# Patient Record
Sex: FEMALE | Race: AMERICAN INDIAN OR ALASKA NATIVE | Employment: UNEMPLOYED | URBAN - NONMETROPOLITAN AREA
[De-identification: names, ages, dates, MRNs, and addresses within clinical notes are randomized per-mention and may not be internally consistent; named-entity substitution may affect disease eponyms.]

---

## 2018-10-22 ENCOUNTER — OFFICE VISIT (OUTPATIENT)
Dept: FAMILY MEDICINE | Facility: OTHER | Age: 83
End: 2018-10-22
Attending: PHYSICIAN ASSISTANT
Payer: COMMERCIAL

## 2018-10-22 VITALS
BODY MASS INDEX: 29.06 KG/M2 | HEART RATE: 88 BPM | DIASTOLIC BLOOD PRESSURE: 76 MMHG | SYSTOLIC BLOOD PRESSURE: 112 MMHG | WEIGHT: 148 LBS | OXYGEN SATURATION: 95 % | TEMPERATURE: 98.3 F | HEIGHT: 60 IN

## 2018-10-22 DIAGNOSIS — B02.9 HERPES ZOSTER WITHOUT COMPLICATION: Primary | ICD-10-CM

## 2018-10-22 DIAGNOSIS — L30.9 DERMATITIS: ICD-10-CM

## 2018-10-22 PROCEDURE — 99202 OFFICE O/P NEW SF 15 MIN: CPT | Performed by: PHYSICIAN ASSISTANT

## 2018-10-22 PROCEDURE — G0463 HOSPITAL OUTPT CLINIC VISIT: HCPCS

## 2018-10-22 RX ORDER — CHLORTHALIDONE 25 MG/1
12.5 TABLET ORAL
COMMUNITY
Start: 2018-06-19 | End: 2018-10-25 | Stop reason: DRUGHIGH

## 2018-10-22 RX ORDER — LISINOPRIL 20 MG/1
2 TABLET ORAL
Refills: 3 | COMMUNITY
Start: 2018-10-15 | End: 2018-12-03 | Stop reason: DRUGHIGH

## 2018-10-22 RX ORDER — TRIAMCINOLONE ACETONIDE 1 MG/G
CREAM TOPICAL
Qty: 80 G | Refills: 0 | Status: SHIPPED | OUTPATIENT
Start: 2018-10-22 | End: 2019-01-22

## 2018-10-22 ASSESSMENT — PATIENT HEALTH QUESTIONNAIRE - PHQ9: 5. POOR APPETITE OR OVEREATING: NOT AT ALL

## 2018-10-22 ASSESSMENT — ANXIETY QUESTIONNAIRES
1. FEELING NERVOUS, ANXIOUS, OR ON EDGE: NOT AT ALL
3. WORRYING TOO MUCH ABOUT DIFFERENT THINGS: NOT AT ALL
7. FEELING AFRAID AS IF SOMETHING AWFUL MIGHT HAPPEN: NOT AT ALL
5. BEING SO RESTLESS THAT IT IS HARD TO SIT STILL: NOT AT ALL
2. NOT BEING ABLE TO STOP OR CONTROL WORRYING: NOT AT ALL
GAD7 TOTAL SCORE: 0
6. BECOMING EASILY ANNOYED OR IRRITABLE: NOT AT ALL

## 2018-10-22 ASSESSMENT — PAIN SCALES - GENERAL: PAINLEVEL: NO PAIN (0)

## 2018-10-22 NOTE — MR AVS SNAPSHOT
After Visit Summary   10/22/2018    Siobhan Garcia    MRN: [id number]           Patient Information     Date Of Birth          1935        Visit Information        Provider Department      10/22/2018 3:00 PM Duyen Martinez PA [facility]        Today's Diagnoses     Herpes zoster without complication    -  1    Dermatitis           Follow-ups after your visit        Your next 10 appointments already scheduled     Oct 25, 2018  9:30 AM CDT   (Arrive by 9:15 AM)   Office Visit with FABY Portillo   [facility]    [address]  [facility] [place] [address]   [phone]           A parent or legal guardian is required to be present at the time of the patient's appointment.  Bring a current list of meds and any records pertaining to this visit.  For Physicals, please bring immunization records and any forms needing to be filled out.  Please arrive 15 minutes early to register.              Who to contact     If you have questions or need follow up information about today's clinic visit or your schedule please contact [facility] directly at [phone].  Normal or non-critical lab and imaging results will be communicated to you by MyChart, letter or phone within 4 business days after the clinic has received the results. If you do not hear from us within 7 days, please contact the clinic through MyChart or phone. If you have a critical or abnormal lab result, we will notify you by phone as soon as possible.  Submit refill requests through [employer] or call your pharmacy and they will forward the refill request to us. Please allow 3 business days for your refill to be completed.          Additional Information About Your Visit        Care EveryWhere ID     This is your Care EveryWhere ID. This could be used by other organizations to access your [facility] medical records  [id number]        Your  Vitals Were     Pulse Temperature Height Pulse Oximetry BMI (Body Mass Index)       88 98.3  F (36.8  C) (Tympanic) 5' (1.524 m) 95% 28.9 kg/m2        Blood Pressure from Last 3 Encounters:   10/22/18 112/76   09/30/14 152/88   09/28/12 140/80    Weight from Last 3 Encounters:   10/22/18 148 lb (67.1 kg)   09/30/14 168 lb 4 oz (76.3 kg)   09/28/12 155 lb 6 oz (70.5 kg)              Today, you had the following     No orders found for display         Today's Medication Changes          These changes are accurate as of 10/22/18  3:33 PM.  If you have any questions, ask your nurse or doctor.               Start taking these medicines.        Dose/Directions    triamcinolone 0.1 % cream   Commonly known as:  KENALOG   Used for:  Dermatitis   Started by:  Duyen Martinez PA        Apply sparingly to affected area three times daily for 14 days.   Quantity:  80 g   Refills:  0            Where to get your medicines      These medications were sent to [facility] - [place], [place] - [address]   [id number] , [place] [address]     Phone:  [phone]     triamcinolone 0.1 % cream                Primary Care Provider    None Specified       No primary provider on file.        Equal Access to Services     DANIELE BELTRE AH: Ilda messinao Soomaali, waaxda luqadaha, qaybta kaalmada adeegyada, clovis hays. So [facility] [phone].    ATENCIÓN: Si habla español, tiene a munoz disposición servicios gratuitos de asistencia lingüística. Llame al [phone].    We comply with applicable federal civil rights laws and [place] laws. We do not discriminate on the basis of race, color, national origin, age, disability, sex, sexual orientation, or gender identity.            Thank you!     Thank you for choosing [facility]  for your care. Our goal is always to provide you with excellent care. Hearing back from our patients is one way we can continue to improve our  services. Please take a few minutes to complete the written survey that you may receive in the mail after your visit with us. Thank you!             Your Updated Medication List - Protect others around you: Learn how to safely use, store and throw away your medicines at www.disposemymeds.org.          This list is accurate as of 10/22/18  3:33 PM.  Always use your most recent med list.                   Brand Name Dispense Instructions for use Diagnosis    chlorthalidone 25 MG tablet    HYGROTON     Take 12.5 mg by mouth        lisinopril 20 MG tablet    PRINIVIL/ZESTRIL     2 tablets        triamcinolone 0.1 % cream    KENALOG    80 g    Apply sparingly to affected area three times daily for 14 days.    Dermatitis

## 2018-10-22 NOTE — NURSING NOTE
Chief Complaint   Patient presents with     Derm Problem     Rash       Initial /76  Pulse 88  Temp 98.3  F (36.8  C) (Tympanic)  Ht 5' (1.524 m)  Wt 148 lb (67.1 kg)  SpO2 95%  BMI 28.9 kg/m2 Estimated body mass index is 28.9 kg/(m^2) as calculated from the following:    Height as of this encounter: 5' (1.524 m).    Weight as of this encounter: 148 lb (67.1 kg).  Medication Reconciliation: complete    Isabel Meza LPN

## 2018-10-22 NOTE — PROGRESS NOTES
SUBJECTIVE:   Siobhan Garcia is a 83 year old female who presents to clinic today for the following health issues: Itchy painful rash on back. No associated symptoms        Rash      Duration: 2 week    Description  Location: Under left armpit across the shoulders of the back  Itching: moderate    Intensity:  moderate    Accompanying signs and symptoms: None    History (similar episodes/previous evaluation): None    Precipitating or alleviating factors:  New exposures:  None  Recent travel: no      Therapies tried and outcome: Healing gold bond          Problem list and histories reviewed & adjusted, as indicated.  Additional history: as documented    There is no problem list on file for this patient.    Past Surgical History:   Procedure Laterality Date     HYSTERECTOMY VAGINAL      12, RSO, AP repair at [place]     TONSILLECTOMY      1980s       Social History   Substance Use Topics     Smoking status: Never Smoker     Smokeless tobacco: Never Used     Alcohol use No      Comment: Alcoholic Drinks/day: glass of wine occasionally     Family History   Problem Relation Age of Onset     Cancer Mother      Cancer,cervical cancer     Substance Abuse Father      Alcohol/Drug, in his 70's     Colon Cancer Brother      Cancer-colon     Cancer Brother      Cancer,Throat         Current Outpatient Prescriptions   Medication Sig Dispense Refill     chlorthalidone (HYGROTON) 25 MG tablet Take 12.5 mg by mouth       triamcinolone (KENALOG) 0.1 % cream Apply sparingly to affected area three times daily for 14 days. 80 g 0     lisinopril (PRINIVIL/ZESTRIL) 20 MG tablet 2 tablets  3     Allergies   Allergen Reactions     Sulfa Drugs Nausea     Bee Venom Rash     Wasp Venom Protein Rash       Reviewed and updated as needed this visit by clinical staff       Reviewed and updated as needed this visit by Provider         ROS:  Constitutional, HEENT, cardiovascular, pulmonary, gi and gu systems are negative, except as  otherwise noted.    OBJECTIVE:                                                    /76  Pulse 88  Temp 98.3  F (36.8  C) (Tympanic)  Ht 5' (1.524 m)  Wt 148 lb (67.1 kg)  SpO2 95%  BMI 28.9 kg/m2  Body mass index is 28.9 kg/(m^2).  GENERAL APPEARANCE: healthy, alert and no distress  RESP: lungs clear to auscultation - no rales, rhonchi or wheezes  CV: regular rates and rhythm, normal S1 S2, no S3 or S4 and no murmur, click or rub  SKIN: vesicular erythema left T3 dermatome  PSYCH: mentation appears normal and affect normal/bright         ASSESSMENT/PLAN:                                                    (B02.9) Herpes zoster without complication  (primary encounter diagnosis)  Comment: Written info to patient. Too late for anti-vral      (L30.9) Dermatitis  Comment: use for itching  Plan: triamcinolone (KENALOG) 0.1 % cream                  Follow up if symptoms persist or worsen.      FABY Srivastava  [facility]

## 2018-10-23 ASSESSMENT — PATIENT HEALTH QUESTIONNAIRE - PHQ9: SUM OF ALL RESPONSES TO PHQ QUESTIONS 1-9: 3

## 2018-10-23 ASSESSMENT — ANXIETY QUESTIONNAIRES: GAD7 TOTAL SCORE: 0

## 2018-10-24 NOTE — PROGRESS NOTES
SUBJECTIVE:   Siobhan Garcia is a 83 year old female who presents to clinic today to establish cares. History of HTN. Takes Lisinopril 40 mg daily and Chlorthalidone 12.5 mg daily. She checks BP at 120/70. She would like to stop the Chlorthalidone.. [facility] hysterectomy and bilateral oophorectomy in .Tonsillectomy. Retired teacher. . 4 sons. Non-smoker. Previous cares at [facility].      New Patient/Transfer of Care    shingles      Duration: 1.5 weeks    Description  Location: upper left back  Itching: moderate    Intensity:  moderate    Accompanying signs and symptoms: just itching so far    History (similar episodes/previous evaluation): None    Precipitating or alleviating factors:  New exposures:  None   Recent travel: no      Therapies tried and outcome: Kenalog cream, does not help with the itching      Problem list and histories reviewed & adjusted, as indicated.  Additional history:     There is no problem list on file for this patient.    Past Surgical History:   Procedure Laterality Date     HYSTERECTOMY VAGINAL      12, RSO, AP repair at [facility]     TONSILLECTOMY      1980s       Social History   Substance Use Topics     Smoking status: Never Smoker     Smokeless tobacco: Never Used     Alcohol use No      Comment: Alcoholic Drinks/day: glass of wine occasionally     Family History   Problem Relation Age of Onset     Cancer Mother      Cancer,cervical cancer     Substance Abuse Father      Alcohol/Drug, in his 70's     Colon Cancer Brother      Cancer-colon     Cancer Brother      Cancer,Throat         Current Outpatient Prescriptions   Medication Sig Dispense Refill     lisinopril (PRINIVIL/ZESTRIL) 20 MG tablet 2 tablets  3     triamcinolone (KENALOG) 0.1 % cream Apply sparingly to affected area three times daily for 14 days. 80 g 0     Allergies   Allergen Reactions     Sulfa Drugs Nausea     Bee Venom Rash     Wasp Venom Protein Rash       Reviewed and updated as needed this visit by  clinical staff       Reviewed and updated as needed this visit by Provider         ROS:  Constitutional, HEENT, cardiovascular, pulmonary, gi and gu systems are negative, except as otherwise noted.    OBJECTIVE:                                                    /73  Pulse 85  Temp 97.1  F (36.2  C) (Tympanic)  Resp 18  Ht 5' (1.524 m)  Wt 153 lb (69.4 kg)  SpO2 95%  BMI 29.88 kg/m2  Body mass index is 29.88 kg/(m^2).  GENERAL APPEARANCE: healthy, alert and no distress  RESP: lungs clear to auscultation - no rales, rhonchi or wheezes  CV: regular rates and rhythm, normal S1 S2, no S3 or S4 and no murmur, click or rub  SKIN: no suspicious lesions or rashes  PSYCH: mentation appears normal and affect normal/bright         ASSESSMENT/PLAN:                                                    (Z76.89) Encounter to establish care  (primary encounter diagnosis)  Comment: she will return for fasting labs. Stop Chlorthalidone. Monitor BP's. Return with readings    (I10) Benign essential hypertension  Comment: stable. As above        1 month f/u    FABY Srivastava  [facility]

## 2018-10-25 ENCOUNTER — OFFICE VISIT (OUTPATIENT)
Dept: FAMILY MEDICINE | Facility: OTHER | Age: 83
End: 2018-10-25
Attending: PHYSICIAN ASSISTANT
Payer: COMMERCIAL

## 2018-10-25 VITALS
HEIGHT: 60 IN | RESPIRATION RATE: 18 BRPM | HEART RATE: 85 BPM | WEIGHT: 153 LBS | TEMPERATURE: 97.1 F | DIASTOLIC BLOOD PRESSURE: 73 MMHG | OXYGEN SATURATION: 95 % | BODY MASS INDEX: 30.04 KG/M2 | SYSTOLIC BLOOD PRESSURE: 120 MMHG

## 2018-10-25 DIAGNOSIS — I10 BENIGN ESSENTIAL HYPERTENSION: ICD-10-CM

## 2018-10-25 DIAGNOSIS — Z76.89 ENCOUNTER TO ESTABLISH CARE: Primary | ICD-10-CM

## 2018-10-25 PROCEDURE — G0463 HOSPITAL OUTPT CLINIC VISIT: HCPCS

## 2018-10-25 PROCEDURE — 99213 OFFICE O/P EST LOW 20 MIN: CPT | Performed by: PHYSICIAN ASSISTANT

## 2018-10-25 PROCEDURE — 94760 N-INVAS EAR/PLS OXIMETRY 1: CPT

## 2018-10-25 ASSESSMENT — PATIENT HEALTH QUESTIONNAIRE - PHQ9
5. POOR APPETITE OR OVEREATING: NOT AT ALL
SUM OF ALL RESPONSES TO PHQ QUESTIONS 1-9: 2

## 2018-10-25 ASSESSMENT — ANXIETY QUESTIONNAIRES
7. FEELING AFRAID AS IF SOMETHING AWFUL MIGHT HAPPEN: NOT AT ALL
IF YOU CHECKED OFF ANY PROBLEMS ON THIS QUESTIONNAIRE, HOW DIFFICULT HAVE THESE PROBLEMS MADE IT FOR YOU TO DO YOUR WORK, TAKE CARE OF THINGS AT HOME, OR GET ALONG WITH OTHER PEOPLE: NOT DIFFICULT AT ALL
GAD7 TOTAL SCORE: 0
2. NOT BEING ABLE TO STOP OR CONTROL WORRYING: NOT AT ALL
5. BEING SO RESTLESS THAT IT IS HARD TO SIT STILL: NOT AT ALL
1. FEELING NERVOUS, ANXIOUS, OR ON EDGE: NOT AT ALL
3. WORRYING TOO MUCH ABOUT DIFFERENT THINGS: NOT AT ALL
6. BECOMING EASILY ANNOYED OR IRRITABLE: NOT AT ALL

## 2018-10-25 ASSESSMENT — PAIN SCALES - GENERAL: PAINLEVEL: NO PAIN (0)

## 2018-10-25 NOTE — MR AVS SNAPSHOT
After Visit Summary   10/25/2018    Siobhan Garcia    MRN: [id number]           Patient Information     Date Of Birth          1935        Visit Information        Provider Department      10/25/2018 9:30 AM Duyen Martinez PA [facility]        Today's Diagnoses     Encounter to establish care    -  1    Benign essential hypertension           Follow-ups after your visit        Future tests that were ordered for you today     Open Future Orders        Priority Expected Expires Ordered    Basic metabolic panel Routine  1/25/2019 10/25/2018    Lipid Profile Routine  1/25/2019 10/25/2018    CBC with platelets Routine  1/25/2019 10/25/2018            Who to contact     If you have questions or need follow up information about today's clinic visit or your schedule please contact [facility] directly at [phone].  Normal or non-critical lab and imaging results will be communicated to you by MyChart, letter or phone within 4 business days after the clinic has received the results. If you do not hear from us within 7 days, please contact the clinic through MyChart or phone. If you have a critical or abnormal lab result, we will notify you by phone as soon as possible.  Submit refill requests through [employer] or call your pharmacy and they will forward the refill request to us. Please allow 3 business days for your refill to be completed.          Additional Information About Your Visit        Care EveryWhere ID     This is your Care EveryWhere ID. This could be used by other organizations to access your [facility] medical records  [id number]        Your Vitals Were     Pulse Temperature Respirations Height Pulse Oximetry BMI (Body Mass Index)    85 97.1  F (36.2  C) (Tympanic) 18 5' (1.524 m) 95% 29.88 kg/m2       Blood Pressure from Last 3 Encounters:   10/25/18 120/73   10/22/18 112/76   09/30/14 152/88    Weight from Last 3 Encounters:   10/25/18 153  lb (69.4 kg)   10/22/18 148 lb (67.1 kg)   09/30/14 168 lb 4 oz (76.3 kg)                 Today's Medication Changes          These changes are accurate as of 10/25/18 10:00 AM.  If you have any questions, ask your nurse or doctor.               Stop taking these medicines if you haven't already. Please contact your care team if you have questions.     chlorthalidone 25 MG tablet   Commonly known as:  HYGROTON   Stopped by:  Duyen Mratinez PA                    Primary Care Provider Office Phone # Fax #    FABY Portillo [phone]       [facility] [place] [address]  [place] [address]        Equal Access to Services     DANIELE BELTRE : Hadii darnell ku hadasho Soomaali, waaxda luqadaha, qaybta kaalmada adeegyada, waxgatito delgado haydick moreno . So [facility] [phone].    ATENCIÓN: Si habla español, tiene a munoz disposición servicios gratuitos de asistencia lingüística. Llame al [phone].    We comply with applicable federal civil rights laws and [place] laws. We do not discriminate on the basis of race, color, national origin, age, disability, sex, sexual orientation, or gender identity.            Thank you!     Thank you for choosing [facility]  for your care. Our goal is always to provide you with excellent care. Hearing back from our patients is one way we can continue to improve our services. Please take a few minutes to complete the written survey that you may receive in the mail after your visit with us. Thank you!             Your Updated Medication List - Protect others around you: Learn how to safely use, store and throw away your medicines at www.disposemymeds.org.          This list is accurate as of 10/25/18 10:00 AM.  Always use your most recent med list.                   Brand Name Dispense Instructions for use Diagnosis    lisinopril 20 MG tablet    PRINIVIL/ZESTRIL     2 tablets        triamcinolone 0.1 % cream    KENALOG    80  g    Apply sparingly to affected area three times daily for 14 days.    Dermatitis

## 2018-10-25 NOTE — NURSING NOTE
Chief Complaint   Patient presents with     Establish Care     Shingles       Initial /73  Pulse 85  Temp 97.1  F (36.2  C) (Tympanic)  Resp 18  Ht 5' (1.524 m)  Wt 153 lb (69.4 kg)  SpO2 95%  BMI 29.88 kg/m2 Estimated body mass index is 29.88 kg/(m^2) as calculated from the following:    Height as of this encounter: 5' (1.524 m).    Weight as of this encounter: 153 lb (69.4 kg).  Medication Reconciliation: complete    Kristan Hansen LPN

## 2018-10-26 ASSESSMENT — ANXIETY QUESTIONNAIRES: GAD7 TOTAL SCORE: 0

## 2018-11-29 DIAGNOSIS — I10 BENIGN ESSENTIAL HYPERTENSION: ICD-10-CM

## 2018-11-29 LAB
ANION GAP SERPL CALCULATED.3IONS-SCNC: 5 MMOL/L (ref 3–14)
BUN SERPL-MCNC: 26 MG/DL (ref 7–30)
CALCIUM SERPL-MCNC: 9.7 MG/DL (ref 8.5–10.1)
CHLORIDE SERPL-SCNC: 105 MMOL/L (ref 94–109)
CHOLEST SERPL-MCNC: 192 MG/DL
CO2 SERPL-SCNC: 26 MMOL/L (ref 20–32)
CREAT SERPL-MCNC: 0.96 MG/DL (ref 0.52–1.04)
ERYTHROCYTE [DISTWIDTH] IN BLOOD BY AUTOMATED COUNT: 12.2 % (ref 10–15)
GFR SERPL CREATININE-BSD FRML MDRD: 56 ML/MIN/1.7M2
GLUCOSE SERPL-MCNC: 98 MG/DL (ref 70–99)
HCT VFR BLD AUTO: 33.9 % (ref 35–47)
HDLC SERPL-MCNC: 59 MG/DL
HGB BLD-MCNC: 10.8 G/DL (ref 11.7–15.7)
LDLC SERPL CALC-MCNC: 109 MG/DL
MCH RBC QN AUTO: 30.2 PG (ref 26.5–33)
MCHC RBC AUTO-ENTMCNC: 31.9 G/DL (ref 31.5–36.5)
MCV RBC AUTO: 95 FL (ref 78–100)
NONHDLC SERPL-MCNC: 133 MG/DL
PLATELET # BLD AUTO: 542 10E9/L (ref 150–450)
POTASSIUM SERPL-SCNC: 4.2 MMOL/L (ref 3.4–5.3)
RBC # BLD AUTO: 3.58 10E12/L (ref 3.8–5.2)
SODIUM SERPL-SCNC: 136 MMOL/L (ref 133–144)
TRIGL SERPL-MCNC: 120 MG/DL
WBC # BLD AUTO: 8.2 10E9/L (ref 4–11)

## 2018-11-29 PROCEDURE — 80061 LIPID PANEL: CPT | Mod: ZL | Performed by: PHYSICIAN ASSISTANT

## 2018-11-29 PROCEDURE — 85027 COMPLETE CBC AUTOMATED: CPT | Mod: ZL | Performed by: PHYSICIAN ASSISTANT

## 2018-11-29 PROCEDURE — 36415 COLL VENOUS BLD VENIPUNCTURE: CPT | Mod: ZL | Performed by: PHYSICIAN ASSISTANT

## 2018-11-29 PROCEDURE — 80048 BASIC METABOLIC PNL TOTAL CA: CPT | Mod: ZL | Performed by: PHYSICIAN ASSISTANT

## 2018-11-30 NOTE — PROGRESS NOTES
SUBJECTIVE:   Siobhan Garcia is a 83 year old female who presents to clinic today for the following health issues:      Musculoskeletal problem/pain      Duration: past couple weeks    Description  Location: muscle aches all over    Intensity:  severe    Accompanying signs and symptoms: radiation of pain to All over at different times    History  Previous similar problem: no   Previous evaluation:  none    Precipitating or alleviating factors:  Trauma or overuse: no   Aggravating factors include: unsure constant ache    Therapies tried and outcome: rest/inactivity, Ibuprofen and NSAID - Aleve, helps for couple hours but has to keep repeating doses.          Problem list and histories reviewed & adjusted, as indicated.  Additional history: as documented    Patient Active Problem List   Diagnosis     Benign essential hypertension     Past Surgical History:   Procedure Laterality Date     HYSTERECTOMY VAGINAL      12, RSO, AP repair at [place]     TONSILLECTOMY      [facility]       Social History   Substance Use Topics     Smoking status: Never Smoker     Smokeless tobacco: Never Used     Alcohol use No      Comment: Alcoholic Drinks/day: glass of wine occasionally     Family History   Problem Relation Age of Onset     Cancer Mother      Cancer,cervical cancer     Substance Abuse Father      Alcohol/Drug, in his 70's     Colon Cancer Brother      Cancer-colon     Cancer Brother      Cancer,Throat         Current Outpatient Prescriptions   Medication Sig Dispense Refill     lisinopril (PRINIVIL/ZESTRIL) 20 MG tablet 2 tablets  3     triamcinolone (KENALOG) 0.1 % cream Apply sparingly to affected area three times daily for 14 days. 80 g 0     Allergies   Allergen Reactions     Sulfa Drugs Nausea     Bee Venom Rash     Wasp Venom Protein Rash       Reviewed and updated as needed this visit by clinical staff       Reviewed and updated as needed this visit by Provider         ROS:  Constitutional, HEENT,  cardiovascular, pulmonary, gi and gu systems are negative, except as otherwise noted.    OBJECTIVE:                                                    /58 (BP Location: Right arm, Patient Position: Sitting, Cuff Size: Adult Regular)  Pulse 87  Temp 97.5  F (36.4  C) (Tympanic)  Wt 154 lb (69.9 kg)  SpO2 93%  BMI 30.08 kg/m2  Body mass index is 30.08 kg/(m^2).          Physical Exam:  Constitutional: healthy, alert and no acute distress  Skin: No suspicious rash or skin lesion  ENT: Posterior pharynx moist and pink without edema or exudate.  EAC's clear. TM's intact bilateral.  CV: RRR. No murmur  Pulm: Lungs clear to auscultation without wheeze, rales or rhonchi  GI: Abdomen soft, non-tender. BS normal. No masses, organomegaly  Psych: mentation and affect appear normal                   ASSESSMENT/PLAN:                                                      (R82.79) Abnormal findings on microbiological examination of urine  (primary encounter diagnosis)  Plan: Urine Culture Aerobic Bacterial, ciprofloxacin         (CIPRO) 500 MG tablet            (M79.10) Myalgia  Comment: Sed rate 87. Bacteriuria. Unsure if correlation. Will treat with short f/u to recheck labs  Plan: CBC with platelets and differential, *UA reflex        to Microscopic and Culture - [facility],         Erythrocyte sedimentation rate auto, Urine         Microscopic, predniSONE (DELTASONE) 20 MG         tablet            (I10) Benign essential hypertension  Comment: RF  Plan: lisinopril (PRINIVIL/ZESTRIL) 40 MG tablet            (D64.9) Anemia, unspecified type  Comment: History of this. Restart Ferrous Sulfate 325 mg daily with 1 month recheck        Rest, increase fluids, Tylenol for fever or discomfort. Return to clinic if symptoms persist or worsen.      FABY Srivastava  [facility]

## 2018-12-03 ENCOUNTER — OFFICE VISIT (OUTPATIENT)
Dept: FAMILY MEDICINE | Facility: OTHER | Age: 83
End: 2018-12-03
Attending: PHYSICIAN ASSISTANT
Payer: COMMERCIAL

## 2018-12-03 VITALS
BODY MASS INDEX: 30.08 KG/M2 | OXYGEN SATURATION: 93 % | WEIGHT: 154 LBS | SYSTOLIC BLOOD PRESSURE: 138 MMHG | DIASTOLIC BLOOD PRESSURE: 58 MMHG | HEART RATE: 87 BPM | TEMPERATURE: 97.5 F

## 2018-12-03 DIAGNOSIS — I10 BENIGN ESSENTIAL HYPERTENSION: ICD-10-CM

## 2018-12-03 DIAGNOSIS — D64.9 ANEMIA, UNSPECIFIED TYPE: ICD-10-CM

## 2018-12-03 DIAGNOSIS — M79.10 MYALGIA: ICD-10-CM

## 2018-12-03 DIAGNOSIS — R82.79 ABNORMAL FINDINGS ON MICROBIOLOGICAL EXAMINATION OF URINE: Primary | ICD-10-CM

## 2018-12-03 LAB
ALBUMIN UR-MCNC: NEGATIVE MG/DL
APPEARANCE UR: CLEAR
BACTERIA #/AREA URNS HPF: ABNORMAL /HPF
BASOPHILS # BLD AUTO: 0.1 10E9/L (ref 0–0.2)
BASOPHILS NFR BLD AUTO: 0.9 %
BILIRUB UR QL STRIP: NEGATIVE
COLOR UR AUTO: YELLOW
DIFFERENTIAL METHOD BLD: ABNORMAL
EOSINOPHIL # BLD AUTO: 0.1 10E9/L (ref 0–0.7)
EOSINOPHIL NFR BLD AUTO: 0.9 %
ERYTHROCYTE [DISTWIDTH] IN BLOOD BY AUTOMATED COUNT: 12.2 % (ref 10–15)
ERYTHROCYTE [SEDIMENTATION RATE] IN BLOOD BY WESTERGREN METHOD: 87 MM/H (ref 0–30)
GLUCOSE UR STRIP-MCNC: NEGATIVE MG/DL
HCT VFR BLD AUTO: 33 % (ref 35–47)
HGB BLD-MCNC: 10.4 G/DL (ref 11.7–15.7)
HGB UR QL STRIP: NEGATIVE
KETONES UR STRIP-MCNC: NEGATIVE MG/DL
LEUKOCYTE ESTERASE UR QL STRIP: ABNORMAL
LYMPHOCYTES # BLD AUTO: 1.5 10E9/L (ref 0.8–5.3)
LYMPHOCYTES NFR BLD AUTO: 14.9 %
MCH RBC QN AUTO: 30.1 PG (ref 26.5–33)
MCHC RBC AUTO-ENTMCNC: 31.5 G/DL (ref 31.5–36.5)
MCV RBC AUTO: 95 FL (ref 78–100)
MONOCYTES # BLD AUTO: 0.8 10E9/L (ref 0–1.3)
MONOCYTES NFR BLD AUTO: 8.2 %
NEUTROPHILS # BLD AUTO: 7.5 10E9/L (ref 1.6–8.3)
NEUTROPHILS NFR BLD AUTO: 75.1 %
NITRATE UR QL: NEGATIVE
NON-SQ EPI CELLS #/AREA URNS LPF: ABNORMAL /LPF
PH UR STRIP: 6 PH (ref 5–7)
PLATELET # BLD AUTO: 392 10E9/L (ref 150–450)
RBC # BLD AUTO: 3.46 10E12/L (ref 3.8–5.2)
RBC #/AREA URNS AUTO: ABNORMAL /HPF
SOURCE: ABNORMAL
SP GR UR STRIP: 1.02 (ref 1–1.03)
UROBILINOGEN UR STRIP-ACNC: 0.2 EU/DL (ref 0.2–1)
WBC # BLD AUTO: 10 10E9/L (ref 4–11)
WBC #/AREA URNS AUTO: ABNORMAL /HPF

## 2018-12-03 PROCEDURE — 85025 COMPLETE CBC W/AUTO DIFF WBC: CPT | Mod: ZL | Performed by: PHYSICIAN ASSISTANT

## 2018-12-03 PROCEDURE — 81001 URINALYSIS AUTO W/SCOPE: CPT | Mod: ZL | Performed by: PHYSICIAN ASSISTANT

## 2018-12-03 PROCEDURE — G0463 HOSPITAL OUTPT CLINIC VISIT: HCPCS

## 2018-12-03 PROCEDURE — 87086 URINE CULTURE/COLONY COUNT: CPT | Mod: ZL | Performed by: PHYSICIAN ASSISTANT

## 2018-12-03 PROCEDURE — 99214 OFFICE O/P EST MOD 30 MIN: CPT | Performed by: PHYSICIAN ASSISTANT

## 2018-12-03 PROCEDURE — 85652 RBC SED RATE AUTOMATED: CPT | Mod: ZL | Performed by: PHYSICIAN ASSISTANT

## 2018-12-03 PROCEDURE — 36415 COLL VENOUS BLD VENIPUNCTURE: CPT | Mod: ZL | Performed by: PHYSICIAN ASSISTANT

## 2018-12-03 RX ORDER — CIPROFLOXACIN 500 MG/1
500 TABLET, FILM COATED ORAL 2 TIMES DAILY
Qty: 14 TABLET | Refills: 0 | Status: SHIPPED | OUTPATIENT
Start: 2018-12-03 | End: 2018-12-20

## 2018-12-03 RX ORDER — LISINOPRIL 40 MG/1
40 TABLET ORAL DAILY
Qty: 90 TABLET | Refills: 1 | Status: SHIPPED | OUTPATIENT
Start: 2018-12-03

## 2018-12-03 RX ORDER — PREDNISONE 20 MG/1
40 TABLET ORAL DAILY
Qty: 10 TABLET | Refills: 0 | Status: SHIPPED | OUTPATIENT
Start: 2018-12-03 | End: 2018-12-08

## 2018-12-03 ASSESSMENT — ANXIETY QUESTIONNAIRES
IF YOU CHECKED OFF ANY PROBLEMS ON THIS QUESTIONNAIRE, HOW DIFFICULT HAVE THESE PROBLEMS MADE IT FOR YOU TO DO YOUR WORK, TAKE CARE OF THINGS AT HOME, OR GET ALONG WITH OTHER PEOPLE: NOT DIFFICULT AT ALL
5. BEING SO RESTLESS THAT IT IS HARD TO SIT STILL: NOT AT ALL
6. BECOMING EASILY ANNOYED OR IRRITABLE: NOT AT ALL
1. FEELING NERVOUS, ANXIOUS, OR ON EDGE: NOT AT ALL
GAD7 TOTAL SCORE: 0
4. TROUBLE RELAXING: NOT AT ALL
7. FEELING AFRAID AS IF SOMETHING AWFUL MIGHT HAPPEN: NOT AT ALL
2. NOT BEING ABLE TO STOP OR CONTROL WORRYING: NOT AT ALL
3. WORRYING TOO MUCH ABOUT DIFFERENT THINGS: NOT AT ALL

## 2018-12-03 ASSESSMENT — PATIENT HEALTH QUESTIONNAIRE - PHQ9: SUM OF ALL RESPONSES TO PHQ QUESTIONS 1-9: 5

## 2018-12-03 ASSESSMENT — PAIN SCALES - GENERAL: PAINLEVEL: EXTREME PAIN (9)

## 2018-12-03 NOTE — MR AVS SNAPSHOT
After Visit Summary   12/3/2018    Siobhan Garcia    MRN: [id number]           Patient Information     Date Of Birth          1935        Visit Information        Provider Department      12/3/2018 11:00 AM Duyen Martinez PA [facility]        Today's Diagnoses     Abnormal findings on microbiological examination of urine    -  1    Myalgia        Benign essential hypertension        Anemia, unspecified type          Care Instructions    Follow up if symptoms persist or worsen.            Follow-ups after your visit        Your next 10 appointments already scheduled     Dec 18, 2018 11:00 AM CST   (Arrive by 10:45 AM)   SHORT with FABY Portillo   [facility]    [address]  [facility] [address]   [phone]              Who to contact     If you have questions or need follow up information about today's clinic visit or your schedule please contact [facility] directly at [phone].  Normal or non-critical lab and imaging results will be communicated to you by MyChart, letter or phone within 4 business days after the clinic has received the results. If you do not hear from us within 7 days, please contact the clinic through MyChart or phone. If you have a critical or abnormal lab result, we will notify you by phone as soon as possible.  Submit refill requests through [employer] or call your pharmacy and they will forward the refill request to us. Please allow 3 business days for your refill to be completed.          Additional Information About Your Visit        Care EveryWhere ID     This is your Care EveryWhere ID. This could be used by other organizations to access your [facility] medical records  [id number]        Your Vitals Were     Pulse Temperature Pulse Oximetry BMI (Body Mass Index)          87 97.5  F (36.4  C) (Tympanic) 93% 30.08 kg/m2         Blood Pressure from  Last 3 Encounters:   12/03/18 138/58   10/25/18 120/73   10/22/18 112/76    Weight from Last 3 Encounters:   12/03/18 154 lb (69.9 kg)   10/25/18 153 lb (69.4 kg)   10/22/18 148 lb (67.1 kg)              We Performed the Following     *UA reflex to Microscopic and Culture - [facility]     CBC with platelets and differential     Erythrocyte sedimentation rate auto     Urine Culture Aerobic Bacterial     Urine Microscopic          Today's Medication Changes          These changes are accurate as of 12/3/18 11:49 AM.  If you have any questions, ask your nurse or doctor.               Start taking these medicines.        Dose/Directions    ciprofloxacin 500 MG tablet   Commonly known as:  CIPRO   Used for:  Abnormal findings on microbiological examination of urine   Started by:  Duyen Martinez PA        Dose:  500 mg   Take 1 tablet (500 mg) by mouth 2 times daily   Quantity:  14 tablet   Refills:  0       predniSONE 20 MG tablet   Commonly known as:  DELTASONE   Used for:  Myalgia   Started by:  Duyen Martinez PA        Dose:  40 mg   Take 2 tablets (40 mg) by mouth daily for 5 days   Quantity:  10 tablet   Refills:  0         These medicines have changed or have updated prescriptions.        Dose/Directions    lisinopril 40 MG tablet   Commonly known as:  PRINIVIL/ZESTRIL   This may have changed:    - medication strength  - how to take this  - when to take this   Used for:  Benign essential hypertension   Changed by:  Duyen Martinez PA        Dose:  40 mg   Take 1 tablet (40 mg) by mouth daily   Quantity:  90 tablet   Refills:  1            Where to get your medicines      These [place] [facility] [address]  [address] [address]     Phone:  [phone]     lisinopril 40 MG tablet         These medications were sent to [facility] - [place] - [address] [address]     Phone:   [phone]     ciprofloxacin 500 MG tablet    predniSONE 20 MG tablet                Primary Care Provider Office Phone # Fax #    Duyen FELTON FABY Martinez [phone]       [facility] [place] [address]  [place] [address]        Equal Access to Services     DANIELE BELTRE : Hadii aad ku hadasho Soomaali, waaxda luqadaha, qaybta kaalmada adeegyada, waxay idiin hayaan adeeg michaelnoreen lacollette hays. So [facility] [phone].    ATENCIÓN: Si habla español, tiene a munoz disposición servicios gratuitos de asistencia lingüística. Llame al [phone].    We comply with applicable federal civil rights laws and [place] laws. We do not discriminate on the basis of race, color, national origin, age, disability, sex, sexual orientation, or gender identity.            Thank you!     Thank you for choosing [facility]  for your care. Our goal is always to provide you with excellent care. Hearing back from our patients is one way we can continue to improve our services. Please take a few minutes to complete the written survey that you may receive in the mail after your visit with us. Thank you!             Your Updated Medication List - Protect others around you: Learn how to safely use, store and throw away your medicines at www.disposemymeds.org.          This list is accurate as of 12/3/18 11:49 AM.  Always use your most recent med list.                   Brand Name Dispense Instructions for use Diagnosis    ciprofloxacin 500 MG tablet    CIPRO    14 tablet    Take 1 tablet (500 mg) by mouth 2 times daily    Abnormal findings on microbiological examination of urine       lisinopril 40 MG tablet    PRINIVIL/ZESTRIL    90 tablet    Take 1 tablet (40 mg) by mouth daily    Benign essential hypertension       predniSONE 20 MG tablet    DELTASONE    10 tablet    Take 2 tablets (40 mg) by mouth daily for 5 days    Myalgia       triamcinolone 0.1 % external cream    KENALOG    80 g    Apply  sparingly to affected area three times daily for 14 days.    Dermatitis

## 2018-12-03 NOTE — NURSING NOTE
Chief Complaint   Patient presents with     Musculoskeletal Problem       Initial /58 (BP Location: Right arm, Patient Position: Sitting, Cuff Size: Adult Regular)  Pulse 87  Temp 97.5  F (36.4  C) (Tympanic)  Wt 154 lb (69.9 kg)  SpO2 93%  BMI 30.08 kg/m2 Estimated body mass index is 30.08 kg/(m^2) as calculated from the following:    Height as of 10/25/18: 5' (1.524 m).    Weight as of this encounter: 154 lb (69.9 kg).  Medication Reconciliation: complete    Sultana Briones LPN

## 2018-12-04 LAB
BACTERIA SPEC CULT: NORMAL
SPECIMEN SOURCE: NORMAL

## 2018-12-04 ASSESSMENT — ANXIETY QUESTIONNAIRES: GAD7 TOTAL SCORE: 0

## 2018-12-06 NOTE — PROGRESS NOTES
SUBJECTIVE:   Siobhan Garcia is a 83 year old female who presents to clinic today for the following health issues:    Muscle aches      Duration: 3 weeks    Description (location/character/radiation): all over body    Intensity:  moderate    Accompanying signs and symptoms: muscle aches    History (similar episodes/previous evaluation): kory Geller 12/3/18    Precipitating or alleviating factors: None    Therapies tried and outcome: ibuprofen, prednisone       PROBLEMS TO ADD ON...    Problem list and histories reviewed & adjusted, as indicated.  Additional history: pain came back right away after stopping the prednisone.  Lengthy review today.  She felt she had a UTI and that's why she hurt.  I reviewed at length.  We wrote things down.  I think she has PMR.      Patient Active Problem List   Diagnosis     Benign essential hypertension     Uterine prolapse     Hyperlipidemia     Past Surgical History:   Procedure Laterality Date     HYSTERECTOMY VAGINAL      12, RSO, AP repair at [place]     [facility]OMY      [facility]       Social History     Tobacco Use     Smoking status: Never Smoker     Smokeless tobacco: Never Used   Substance Use Topics     Alcohol use: No     Comment: Alcoholic Drinks/day: glass of wine occasionally     Family History   Problem Relation Age of Onset     Cancer Mother         Cancer,cervical cancer     Substance Abuse Father         Alcohol/Drug, in his 70's     Colon Cancer Brother         Cancer-colon     Cancer Brother         Cancer,Throat         Current Outpatient Medications   Medication Sig Dispense Refill     lisinopril (PRINIVIL/ZESTRIL) 40 MG tablet Take 1 tablet (40 mg) by mouth daily 90 tablet 1     predniSONE (DELTASONE) 20 MG tablet Take 20 mg by mouth daily. 30 tablet 1     triamcinolone (KENALOG) 0.1 % cream Apply sparingly to affected area three times daily for 14 days. 80 g 0     Allergies   Allergen Reactions     Sulfa Drugs Nausea     Bee Venom Rash     Wasp Venom  Protein Rash       Reviewed and updated as needed this visit by clinical staff       Reviewed and updated as needed this visit by Provider         ROS:  Constitutional, HEENT, cardiovascular, pulmonary, gi and gu systems are negative, except as otherwise noted.    OBJECTIVE:                                                    /78   Pulse 85   Temp 98.4  F (36.9  C)   Wt 68 kg (150 lb)   SpO2 95%   BMI 29.29 kg/m    Body mass index is 29.29 kg/m .  GENERAL APPEARANCE: Alert, no acute distress  CV: regular rate and rhythm, no murmur, rub or gallop  RESP: lungs clear to auscultation bilaterally  ABDOMEN: normal bowel sounds, soft, nontender, no hepatosplenomegaly or other masses  SKIN: no suspicious lesions or rashes to visualized skin  NEURO: Alert, oriented x 3, speech and mentation normal  MSK;  Non tender muscles.      Esr recheck today.      ASSESSMENT/PLAN:                                                    1. PMR (polymyalgia rheumatica) (H)  Reviewed.   1 month f/u.  Rheum referral.  Prednisone 20 daily for now.  Anticipate 1 month of that for sure.    - ESR: Erythrocyte sedimentation rate  - RHEUMATOLOGY REFERRAL  - predniSONE (DELTASONE) 20 MG tablet; Take 20 mg by mouth daily.  Dispense: 30 tablet; Refill: 1      25 minutes spent with patient over 50% in counseling about PMR, the fact she didn't have a UTI, and the tx and strategy for this.  I told her I'm not 100% sure it is PMR but it seems likely (high ESR with profound response to prednisone).      Pb Gallardo MD  [facility]

## 2018-12-13 ENCOUNTER — TELEPHONE (OUTPATIENT)
Dept: FAMILY MEDICINE | Facility: OTHER | Age: 83
End: 2018-12-13

## 2018-12-13 NOTE — TELEPHONE ENCOUNTER
I spoke with Siobhan.  She is looking for medication to help with her muscle pains.  Adv that I would forward the message to Dr Rios and call her back with the providers decision.    Leila Matthew

## 2018-12-13 NOTE — TELEPHONE ENCOUNTER
11:39 AM    Reason for Call: Phone Call    Description: pt wants to talk to Yoseph nurse about pains in her muscles    Was an appointment offered for this call? No    If yes : Appointment type              Date    Preferred method for responding to this message: telephone  What is your phone number ? [phone]    If we cannot reach you directly, may we leave a detailed response at the number you provided?  no    Can this message wait until your PCP/provider returns, if available today? Cecily Shore

## 2018-12-13 NOTE — TELEPHONE ENCOUNTER
Left message stating that she will need to schedule a follow up appointment for further discussion.    Leila Matthew MA

## 2018-12-20 ENCOUNTER — OFFICE VISIT (OUTPATIENT)
Dept: FAMILY MEDICINE | Facility: OTHER | Age: 83
End: 2018-12-20
Attending: PHYSICIAN ASSISTANT
Payer: COMMERCIAL

## 2018-12-20 VITALS
SYSTOLIC BLOOD PRESSURE: 168 MMHG | WEIGHT: 150 LBS | BODY MASS INDEX: 29.29 KG/M2 | HEART RATE: 85 BPM | DIASTOLIC BLOOD PRESSURE: 78 MMHG | OXYGEN SATURATION: 95 % | TEMPERATURE: 98.4 F

## 2018-12-20 DIAGNOSIS — M35.3 PMR (POLYMYALGIA RHEUMATICA) (H): Primary | ICD-10-CM

## 2018-12-20 LAB — ERYTHROCYTE [SEDIMENTATION RATE] IN BLOOD BY WESTERGREN METHOD: 82 MM/H (ref 0–30)

## 2018-12-20 PROCEDURE — G0463 HOSPITAL OUTPT CLINIC VISIT: HCPCS

## 2018-12-20 PROCEDURE — 85652 RBC SED RATE AUTOMATED: CPT | Mod: ZL | Performed by: FAMILY MEDICINE

## 2018-12-20 PROCEDURE — 99214 OFFICE O/P EST MOD 30 MIN: CPT | Performed by: FAMILY MEDICINE

## 2018-12-20 PROCEDURE — 36415 COLL VENOUS BLD VENIPUNCTURE: CPT | Mod: ZL | Performed by: FAMILY MEDICINE

## 2018-12-20 RX ORDER — PREDNISONE 20 MG/1
20 TABLET ORAL DAILY
Qty: 30 TABLET | Refills: 1 | Status: SHIPPED | OUTPATIENT
Start: 2018-12-20 | End: 2019-01-22

## 2018-12-20 ASSESSMENT — ANXIETY QUESTIONNAIRES
6. BECOMING EASILY ANNOYED OR IRRITABLE: NOT AT ALL
7. FEELING AFRAID AS IF SOMETHING AWFUL MIGHT HAPPEN: NOT AT ALL
5. BEING SO RESTLESS THAT IT IS HARD TO SIT STILL: NOT AT ALL
3. WORRYING TOO MUCH ABOUT DIFFERENT THINGS: NOT AT ALL
1. FEELING NERVOUS, ANXIOUS, OR ON EDGE: NOT AT ALL
GAD7 TOTAL SCORE: 0
2. NOT BEING ABLE TO STOP OR CONTROL WORRYING: NOT AT ALL

## 2018-12-20 ASSESSMENT — PATIENT HEALTH QUESTIONNAIRE - PHQ9
5. POOR APPETITE OR OVEREATING: NOT AT ALL
SUM OF ALL RESPONSES TO PHQ QUESTIONS 1-9: 0

## 2018-12-20 ASSESSMENT — PAIN SCALES - GENERAL: PAINLEVEL: MODERATE PAIN (5)

## 2018-12-20 NOTE — NURSING NOTE
Chief Complaint   Patient presents with     Urinary Problem     UTI Follow up       Initial /78   Pulse 85   Temp 98.4  F (36.9  C)   Wt 68 kg (150 lb)   SpO2 95%   BMI 29.29 kg/m   Estimated body mass index is 29.29 kg/m  as calculated from the following:    Height as of 10/25/18: 1.524 m (5').    Weight as of this encounter: 68 kg (150 lb).  Medication Reconciliation: complete    Adelita Mallory LPN

## 2018-12-21 ASSESSMENT — ANXIETY QUESTIONNAIRES: GAD7 TOTAL SCORE: 0

## 2019-01-18 NOTE — PROGRESS NOTES
SUBJECTIVE:                                                    Siobhan Garcia is a 83 year old female who presents to clinic today for the following health issues:      PMR      Duration: 1-2 months    Description (location/character/radiation): muscles    Intensity:  mild    Accompanying signs and symptoms: as long as she take the prednisone she has no pain. Wondering how long she needs to be on it.    History (similar episodes/previous evaluation): None    Precipitating or alleviating factors: None    Therapies tried and outcome: prednisone, rheumatology referral, has not gone because [place] is too far but [place] is supposed to call her in February.       PROBLEMS TO ADD ON...    Problem list and histories reviewed & adjusted, as indicated.  Additional history: doing well overall.  Having ongoing nocturia and would like checked.  BP has been stable.  No new issues otherwise.  Pain is very well controlled.     Patient Active Problem List   Diagnosis     Benign essential hypertension     Uterine prolapse     Hyperlipidemia     Past Surgical History:   Procedure Laterality Date     HYSTERECTOMY VAGINAL      12, RSO, AP repair at [place]     TONSILLECTOMY      1980s       Social History     Tobacco Use     Smoking status: Never Smoker     Smokeless tobacco: Never Used   Substance Use Topics     Alcohol use: No     Comment: Alcoholic Drinks/day: glass of wine occasionally     Family History   Problem Relation Age of Onset     Cancer Mother         Cancer,cervical cancer     Substance Abuse Father         Alcohol/Drug, in his 70's     Colon Cancer Brother         Cancer-colon     Cancer Brother         Cancer,Throat         Current Outpatient Medications   Medication Sig Dispense Refill     lisinopril (PRINIVIL/ZESTRIL) 40 MG tablet Take 1 tablet (40 mg) by mouth daily 90 tablet 1     predniSONE (DELTASONE) 20 MG tablet Take 20 mg by mouth daily.       predniSONE (DELTASONE) 20 MG tablet Take 20 mg by  mouth daily. 90 tablet 3     EPINEPHrine (ADRENACLICK) 0.3 MG/0.3ML injection 2-pack Inject 0.3 mg into the muscle       Allergies   Allergen Reactions     Sulfa Drugs Nausea     Bee Venom Rash     Wasp Venom Protein Rash       ROS:  Constitutional, HEENT, cardiovascular, pulmonary, gi and gu systems are negative, except as otherwise noted.    OBJECTIVE:                                                    /64   Pulse 72   Temp 97.8  F (36.6  C) (Tympanic)   Wt 70.3 kg (155 lb)   SpO2 95%   BMI 30.27 kg/m    Body mass index is 30.27 kg/m .  GENERAL APPEARANCE: Alert, no acute distress  CV: regular rate and rhythm, no murmur, rub or gallop  RESP: lungs clear to auscultation bilaterally  ABDOMEN: normal bowel sounds, soft, nontender, no hepatosplenomegaly or other masses  SKIN: no suspicious lesions or rashes to visualized skin  NEURO: Alert, oriented x 3, speech and mentation normal           ASSESSMENT/PLAN:                                                    1. Menopause  Recommend dexa.   - DX Hip/Pelvis/Spine; Future    2. PMR (polymyalgia rheumatica) (H)  Discussed.  Doing well on the 20 mg.  Seeing rheum soon.  Recommend stay on the 20 mg, recheck labs today.  Discussed course of this and usual 1-2 years of prednisone therapy.    - predniSONE (DELTASONE) 20 MG tablet; Take 20 mg by mouth daily.  Dispense: 90 tablet; Refill: 3  - ESR: Erythrocyte sedimentation rate  - Basic metabolic panel  - Urine Microscopic    3. Benign essential hypertension  Up today.  I forgot to recheck.     4. Nocturia  Discussed.  No systemic sx.  Advise against anti spasmodics with age and we reviewed and discussed this.  Recommend urology for ongoing sx.  UA today to further r/o occult infection.    - *UA reflex to Microscopic and Culture - [facility]          Pb Gallardo MD  [facility]

## 2019-01-22 ENCOUNTER — OFFICE VISIT (OUTPATIENT)
Dept: FAMILY MEDICINE | Facility: OTHER | Age: 84
End: 2019-01-22
Attending: FAMILY MEDICINE
Payer: COMMERCIAL

## 2019-01-22 VITALS
DIASTOLIC BLOOD PRESSURE: 64 MMHG | OXYGEN SATURATION: 95 % | SYSTOLIC BLOOD PRESSURE: 162 MMHG | WEIGHT: 155 LBS | TEMPERATURE: 97.8 F | HEART RATE: 72 BPM | BODY MASS INDEX: 30.27 KG/M2

## 2019-01-22 DIAGNOSIS — R35.1 NOCTURIA: ICD-10-CM

## 2019-01-22 DIAGNOSIS — M35.3 PMR (POLYMYALGIA RHEUMATICA) (H): Primary | ICD-10-CM

## 2019-01-22 DIAGNOSIS — Z78.0 MENOPAUSE: ICD-10-CM

## 2019-01-22 DIAGNOSIS — R82.90 ABNORMAL URINE: ICD-10-CM

## 2019-01-22 DIAGNOSIS — I10 BENIGN ESSENTIAL HYPERTENSION: ICD-10-CM

## 2019-01-22 LAB
ALBUMIN UR-MCNC: NEGATIVE MG/DL
AMORPH CRY #/AREA URNS HPF: ABNORMAL /HPF
ANION GAP SERPL CALCULATED.3IONS-SCNC: 7 MMOL/L (ref 3–14)
APPEARANCE UR: ABNORMAL
BACTERIA #/AREA URNS HPF: ABNORMAL /HPF
BILIRUB UR QL STRIP: NEGATIVE
BUN SERPL-MCNC: 24 MG/DL (ref 7–30)
CALCIUM SERPL-MCNC: 9.2 MG/DL (ref 8.5–10.1)
CHLORIDE SERPL-SCNC: 106 MMOL/L (ref 94–109)
CO2 SERPL-SCNC: 27 MMOL/L (ref 20–32)
COLOR UR AUTO: YELLOW
CREAT SERPL-MCNC: 0.89 MG/DL (ref 0.52–1.04)
ERYTHROCYTE [SEDIMENTATION RATE] IN BLOOD BY WESTERGREN METHOD: 30 MM/H (ref 0–30)
GFR SERPL CREATININE-BSD FRML MDRD: 59 ML/MIN/{1.73_M2}
GLUCOSE SERPL-MCNC: 100 MG/DL (ref 70–99)
GLUCOSE UR STRIP-MCNC: NEGATIVE MG/DL
HGB UR QL STRIP: NEGATIVE
KETONES UR STRIP-MCNC: ABNORMAL MG/DL
LEUKOCYTE ESTERASE UR QL STRIP: ABNORMAL
NITRATE UR QL: NEGATIVE
NON-SQ EPI CELLS #/AREA URNS LPF: ABNORMAL /LPF
PH UR STRIP: 6 PH (ref 5–7)
POTASSIUM SERPL-SCNC: 4 MMOL/L (ref 3.4–5.3)
RBC #/AREA URNS AUTO: ABNORMAL /HPF
SODIUM SERPL-SCNC: 140 MMOL/L (ref 133–144)
SOURCE: ABNORMAL
SP GR UR STRIP: >1.03 (ref 1–1.03)
UROBILINOGEN UR STRIP-ACNC: 0.2 EU/DL (ref 0.2–1)
WBC #/AREA URNS AUTO: ABNORMAL /HPF

## 2019-01-22 PROCEDURE — G0463 HOSPITAL OUTPT CLINIC VISIT: HCPCS

## 2019-01-22 PROCEDURE — 36415 COLL VENOUS BLD VENIPUNCTURE: CPT | Mod: ZL | Performed by: FAMILY MEDICINE

## 2019-01-22 PROCEDURE — 87086 URINE CULTURE/COLONY COUNT: CPT | Mod: ZL | Performed by: FAMILY MEDICINE

## 2019-01-22 PROCEDURE — 81001 URINALYSIS AUTO W/SCOPE: CPT | Mod: ZL | Performed by: FAMILY MEDICINE

## 2019-01-22 PROCEDURE — 85652 RBC SED RATE AUTOMATED: CPT | Mod: ZL | Performed by: FAMILY MEDICINE

## 2019-01-22 PROCEDURE — 80048 BASIC METABOLIC PNL TOTAL CA: CPT | Mod: ZL | Performed by: FAMILY MEDICINE

## 2019-01-22 PROCEDURE — 99214 OFFICE O/P EST MOD 30 MIN: CPT | Performed by: FAMILY MEDICINE

## 2019-01-22 RX ORDER — EPINEPHRINE 0.3 MG/.3ML
0.3 INJECTION SUBCUTANEOUS
COMMUNITY
Start: 2017-06-01

## 2019-01-22 RX ORDER — PREDNISONE 20 MG/1
20 TABLET ORAL DAILY
Qty: 90 TABLET | Refills: 3 | Status: SHIPPED | OUTPATIENT
Start: 2019-01-22

## 2019-01-22 RX ORDER — PREDNISONE 20 MG/1
20 TABLET ORAL DAILY
COMMUNITY

## 2019-01-22 ASSESSMENT — PAIN SCALES - GENERAL: PAINLEVEL: NO PAIN (0)

## 2019-01-22 NOTE — NURSING NOTE
Chief Complaint   Patient presents with     Musculoskeletal Problem       Initial /64   Pulse 72   Temp 97.8  F (36.6  C) (Tympanic)   Wt 70.3 kg (155 lb)   SpO2 95%   BMI 30.27 kg/m   Estimated body mass index is 30.27 kg/m  as calculated from the following:    Height as of 10/25/18: 1.524 m (5').    Weight as of this encounter: 70.3 kg (155 lb).  Medication Reconciliation: complete    Cally Whelan MA

## 2019-01-23 ENCOUNTER — HOSPITAL ENCOUNTER (OUTPATIENT)
Dept: BONE DENSITY | Facility: HOSPITAL | Age: 84
Discharge: HOME | End: 2019-01-23
Attending: FAMILY MEDICINE | Admitting: FAMILY MEDICINE
Payer: COMMERCIAL

## 2019-01-23 DIAGNOSIS — Z78.0 MENOPAUSE: ICD-10-CM

## 2019-01-23 PROCEDURE — 77080 DXA BONE DENSITY AXIAL: CPT | Mod: TC

## 2019-01-24 ENCOUNTER — TELEPHONE (OUTPATIENT)
Dept: FAMILY MEDICINE | Facility: OTHER | Age: 84
End: 2019-01-24

## 2019-01-24 DIAGNOSIS — R35.0 URINARY FREQUENCY: Primary | ICD-10-CM

## 2019-01-24 DIAGNOSIS — M81.0 AGE-RELATED OSTEOPOROSIS WITHOUT CURRENT PATHOLOGICAL FRACTURE: Primary | ICD-10-CM

## 2019-01-24 LAB
BACTERIA SPEC CULT: ABNORMAL
Lab: ABNORMAL
SPECIMEN SOURCE: ABNORMAL

## 2019-01-24 RX ORDER — ALENDRONATE SODIUM 70 MG/1
70 TABLET ORAL
Qty: 12 TABLET | Refills: 3 | Status: SHIPPED | OUTPATIENT
Start: 2019-01-24 | End: 2020-01-24

## 2019-01-24 NOTE — TELEPHONE ENCOUNTER
10:52 AM    Reason for Call: Phone Call    Description: Patient called and stated that she changed her mind and that she would like to have her the urology referral placed.     Was an appointment offered for this call? No  If yes : Appointment type              Date    Preferred method for responding to this message: Telephone Call  What is your phone number ?[phone]    If we cannot reach you directly, may we leave a detailed response at the number you provided? Yes    Can this message wait until your PCP/provider returns, if available today? Not applicable    Lona Patricio MA